# Patient Record
Sex: MALE | Race: WHITE
[De-identification: names, ages, dates, MRNs, and addresses within clinical notes are randomized per-mention and may not be internally consistent; named-entity substitution may affect disease eponyms.]

---

## 2018-04-04 ENCOUNTER — HOSPITAL ENCOUNTER (EMERGENCY)
Dept: HOSPITAL 19 - COL.ER | Age: 23
Discharge: HOME | End: 2018-04-04
Payer: SELF-PAY

## 2018-04-04 VITALS — HEIGHT: 72.99 IN | BODY MASS INDEX: 20.6 KG/M2 | WEIGHT: 155.43 LBS

## 2018-04-04 VITALS — DIASTOLIC BLOOD PRESSURE: 78 MMHG | TEMPERATURE: 98.7 F | HEART RATE: 74 BPM | SYSTOLIC BLOOD PRESSURE: 147 MMHG

## 2018-04-04 DIAGNOSIS — K02.9: Primary | ICD-10-CM

## 2018-04-04 DIAGNOSIS — Z87.891: ICD-10-CM

## 2021-04-26 ENCOUNTER — HOSPITAL ENCOUNTER (EMERGENCY)
Dept: HOSPITAL 19 - COL.ER | Age: 26
Discharge: HOME | End: 2021-04-26
Attending: EMERGENCY MEDICINE
Payer: SELF-PAY

## 2021-04-26 VITALS — DIASTOLIC BLOOD PRESSURE: 69 MMHG | SYSTOLIC BLOOD PRESSURE: 142 MMHG | HEART RATE: 59 BPM

## 2021-04-26 VITALS — TEMPERATURE: 98 F

## 2021-04-26 VITALS — BODY MASS INDEX: 21.24 KG/M2 | HEIGHT: 72.99 IN | WEIGHT: 160.28 LBS

## 2021-04-26 DIAGNOSIS — K40.90: Primary | ICD-10-CM

## 2021-04-26 DIAGNOSIS — Z87.891: ICD-10-CM

## 2021-04-26 LAB
ALBUMIN SERPL-MCNC: 4.1 GM/DL (ref 3.5–5)
ALP SERPL-CCNC: 53 U/L (ref 50–136)
ALT SERPL-CCNC: 15 U/L (ref 4–49)
ANION GAP SERPL CALC-SCNC: 8 MMOL/L (ref 7–16)
AST SERPL-CCNC: 23 U/L (ref 15–37)
BASOPHILS # BLD: 0 10*3/UL (ref 0–0.2)
BASOPHILS NFR BLD AUTO: 0.8 % (ref 0–2)
BILIRUB SERPL-MCNC: 0.1 MG/DL (ref 0–1)
BUN SERPL-MCNC: 11 MG/DL (ref 9–20)
CALCIUM SERPL-MCNC: 9.2 MG/DL (ref 8.4–10.2)
CHLORIDE SERPL-SCNC: 105 MMOL/L (ref 98–107)
CO2 SERPL-SCNC: 27 MMOL/L (ref 22–30)
CREAT SERPL-SCNC: 0.74 UMOL/L (ref 0.66–1.25)
EOSINOPHIL # BLD: 0.1 10*3/UL (ref 0–0.7)
EOSINOPHIL NFR BLD: 1.8 % (ref 0–4)
ERYTHROCYTE [DISTWIDTH] IN BLOOD BY AUTOMATED COUNT: 11.7 % (ref 11.5–14.5)
GLUCOSE SERPL-MCNC: 100 MG/DL (ref 74–106)
GRANULOCYTES # BLD AUTO: 58.7 % (ref 42.2–75.2)
HCT VFR BLD AUTO: 39.6 % (ref 42–52)
HGB BLD-MCNC: 13.6 G/DL (ref 13.5–18)
LYMPHOCYTES # BLD: 1.5 10*3/UL (ref 1.2–3.4)
LYMPHOCYTES NFR BLD: 28.7 % (ref 20–51)
MCH RBC QN AUTO: 32 PG (ref 27–31)
MCHC RBC AUTO-ENTMCNC: 34 G/DL (ref 33–37)
MCV RBC AUTO: 92 FL (ref 80–100)
MONOCYTES # BLD: 0.5 10*3/UL (ref 0.1–0.6)
MONOCYTES NFR BLD AUTO: 10 % (ref 1.7–9.3)
NEUTROPHILS # BLD: 3 10*3/UL (ref 1.4–6.5)
PH UR STRIP.AUTO: 6 [PH] (ref 5–8)
PLATELET # BLD AUTO: 172 K/MM3 (ref 130–400)
PMV BLD AUTO: 10.6 FL (ref 7.4–10.4)
POTASSIUM SERPL-SCNC: 4.1 MMOL/L (ref 3.4–5)
PROT SERPL-MCNC: 6.7 GM/DL (ref 6.4–8.2)
RBC # BLD AUTO: 4.29 M/MM3 (ref 4.2–5.6)
SODIUM SERPL-SCNC: 140 MMOL/L (ref 137–145)
SP GR UR STRIP.AUTO: 1.01 (ref 1–1.03)
SQUAMOUS # URNS: (no result) /HPF
URN COLLECT METHOD CLASS: (no result)

## 2021-05-07 ENCOUNTER — HOSPITAL ENCOUNTER (OUTPATIENT)
Dept: HOSPITAL 19 - SDCO | Age: 26
Discharge: HOME | End: 2021-05-07
Attending: SURGERY
Payer: SELF-PAY

## 2021-05-07 VITALS — SYSTOLIC BLOOD PRESSURE: 110 MMHG | HEART RATE: 54 BPM | DIASTOLIC BLOOD PRESSURE: 54 MMHG

## 2021-05-07 VITALS — DIASTOLIC BLOOD PRESSURE: 74 MMHG | TEMPERATURE: 97.3 F | HEART RATE: 86 BPM | SYSTOLIC BLOOD PRESSURE: 132 MMHG

## 2021-05-07 VITALS — SYSTOLIC BLOOD PRESSURE: 110 MMHG | HEART RATE: 44 BPM | DIASTOLIC BLOOD PRESSURE: 77 MMHG

## 2021-05-07 VITALS — DIASTOLIC BLOOD PRESSURE: 47 MMHG | SYSTOLIC BLOOD PRESSURE: 101 MMHG | TEMPERATURE: 98.1 F | HEART RATE: 59 BPM

## 2021-05-07 VITALS — SYSTOLIC BLOOD PRESSURE: 112 MMHG | HEART RATE: 52 BPM | DIASTOLIC BLOOD PRESSURE: 65 MMHG

## 2021-05-07 VITALS — HEART RATE: 56 BPM | DIASTOLIC BLOOD PRESSURE: 63 MMHG | SYSTOLIC BLOOD PRESSURE: 109 MMHG

## 2021-05-07 VITALS — BODY MASS INDEX: 20.25 KG/M2 | WEIGHT: 152.78 LBS | HEIGHT: 72.99 IN

## 2021-05-07 DIAGNOSIS — K40.90: Primary | ICD-10-CM

## 2021-05-07 DIAGNOSIS — Z87.891: ICD-10-CM

## 2021-05-07 DIAGNOSIS — Z20.822: ICD-10-CM

## 2021-05-07 PROCEDURE — C1781 MESH (IMPLANTABLE): HCPCS

## 2021-05-07 NOTE — NUR
Pt to [facility] via cart from OR. Pt drowsy, but awakens easily. Denies pain
or nausea. Exofen glue to right groin site is clean, dry, and intact. Pt takes
sips of water without difficulties. Wife in room. Will continue to monitor.
Call light within reach.

## 2021-05-07 NOTE — NUR
Discharge instructions reviewed with pt. Pt voices understanding. IV site
discontinued with all parts intact. Pt up to dress. Call light within reach.

## 2021-05-07 NOTE — NUR
Pt up to restroom with stand by assistance. Pt voids without difficulties. Pt
back to room. Applesauce given per pt request. Will continue to monitor.

## 2022-01-22 ENCOUNTER — HOSPITAL ENCOUNTER (EMERGENCY)
Dept: HOSPITAL 19 - COL.ER | Age: 27
Discharge: HOME | End: 2022-01-22
Attending: PERSONAL EMERGENCY RESPONSE ATTENDANT
Payer: SELF-PAY

## 2022-01-22 VITALS — SYSTOLIC BLOOD PRESSURE: 138 MMHG | DIASTOLIC BLOOD PRESSURE: 64 MMHG | HEART RATE: 76 BPM | TEMPERATURE: 97.9 F

## 2022-01-22 VITALS — BODY MASS INDEX: 21.24 KG/M2 | WEIGHT: 160.28 LBS | HEIGHT: 72.99 IN

## 2022-01-22 DIAGNOSIS — K08.89: Primary | ICD-10-CM

## 2022-01-27 ENCOUNTER — HOSPITAL ENCOUNTER (EMERGENCY)
Dept: HOSPITAL 19 - COL.ER | Age: 27
Discharge: HOME | End: 2022-01-27
Attending: STUDENT IN AN ORGANIZED HEALTH CARE EDUCATION/TRAINING PROGRAM
Payer: SELF-PAY

## 2022-01-27 VITALS — SYSTOLIC BLOOD PRESSURE: 119 MMHG | DIASTOLIC BLOOD PRESSURE: 72 MMHG | HEART RATE: 74 BPM | TEMPERATURE: 98.8 F

## 2022-01-27 VITALS — WEIGHT: 160.28 LBS | BODY MASS INDEX: 21.24 KG/M2 | HEIGHT: 72.99 IN

## 2022-01-27 DIAGNOSIS — K02.9: Primary | ICD-10-CM
